# Patient Record
Sex: FEMALE | Race: WHITE | NOT HISPANIC OR LATINO | Employment: OTHER | URBAN - METROPOLITAN AREA
[De-identification: names, ages, dates, MRNs, and addresses within clinical notes are randomized per-mention and may not be internally consistent; named-entity substitution may affect disease eponyms.]

---

## 2023-05-09 LAB
ANION GAP IN SER/PLAS: 10 MMOL/L (ref 10–20)
APPEARANCE, URINE: CLEAR
BACTERIA, URINE: ABNORMAL /HPF
BILIRUBIN, URINE: NEGATIVE
BLOOD, URINE: ABNORMAL
CALCIUM (MG/DL) IN SER/PLAS: 9.5 MG/DL (ref 8.6–10.3)
CARBON DIOXIDE, TOTAL (MMOL/L) IN SER/PLAS: 30 MMOL/L (ref 21–32)
CHLORIDE (MMOL/L) IN SER/PLAS: 100 MMOL/L (ref 98–107)
COLOR, URINE: YELLOW
CREATININE (MG/DL) IN SER/PLAS: 0.87 MG/DL (ref 0.5–1.05)
GFR FEMALE: 69 ML/MIN/1.73M2
GLUCOSE (MG/DL) IN SER/PLAS: 99 MG/DL (ref 74–99)
GLUCOSE, URINE: NEGATIVE MG/DL
KETONES, URINE: NEGATIVE MG/DL
LEUKOCYTE ESTERASE, URINE: NEGATIVE
MUCUS, URINE: ABNORMAL /LPF
NITRITE, URINE: NEGATIVE
PH, URINE: 7 (ref 5–8)
POTASSIUM (MMOL/L) IN SER/PLAS: 4 MMOL/L (ref 3.5–5.3)
PROTEIN, URINE: NEGATIVE MG/DL
RBC, URINE: 6 /HPF (ref 0–5)
SODIUM (MMOL/L) IN SER/PLAS: 136 MMOL/L (ref 136–145)
SPECIFIC GRAVITY, URINE: 1.01 (ref 1–1.03)
SQUAMOUS EPITHELIAL CELLS, URINE: 1 /HPF
UREA NITROGEN (MG/DL) IN SER/PLAS: 14 MG/DL (ref 6–23)
UROBILINOGEN, URINE: <2 MG/DL (ref 0–1.9)
WBC, URINE: 1 /HPF (ref 0–5)

## 2023-06-08 LAB
ACTIVATED PARTIAL THROMBOPLASTIN TIME IN PPP BY COAGULATION ASSAY: 32 SEC (ref 26–39)
ANION GAP IN SER/PLAS: 10 MMOL/L (ref 10–20)
APPEARANCE, URINE: CLEAR
BACTERIA, URINE: ABNORMAL /HPF
BILIRUBIN, URINE: NEGATIVE
BLOOD, URINE: ABNORMAL
CALCIUM (MG/DL) IN SER/PLAS: 9.7 MG/DL (ref 8.6–10.3)
CARBON DIOXIDE, TOTAL (MMOL/L) IN SER/PLAS: 30 MMOL/L (ref 21–32)
CHLORIDE (MMOL/L) IN SER/PLAS: 100 MMOL/L (ref 98–107)
COLOR, URINE: YELLOW
CREATININE (MG/DL) IN SER/PLAS: 0.89 MG/DL (ref 0.5–1.05)
ERYTHROCYTE DISTRIBUTION WIDTH (RATIO) BY AUTOMATED COUNT: 13.2 % (ref 11.5–14.5)
ERYTHROCYTE MEAN CORPUSCULAR HEMOGLOBIN CONCENTRATION (G/DL) BY AUTOMATED: 32.9 G/DL (ref 32–36)
ERYTHROCYTE MEAN CORPUSCULAR VOLUME (FL) BY AUTOMATED COUNT: 101 FL (ref 80–100)
ERYTHROCYTES (10*6/UL) IN BLOOD BY AUTOMATED COUNT: 4.92 X10E12/L (ref 4–5.2)
GFR FEMALE: 68 ML/MIN/1.73M2
GLUCOSE (MG/DL) IN SER/PLAS: 134 MG/DL (ref 74–99)
GLUCOSE, URINE: NEGATIVE MG/DL
HEMATOCRIT (%) IN BLOOD BY AUTOMATED COUNT: 49.9 % (ref 36–46)
HEMOGLOBIN (G/DL) IN BLOOD: 16.4 G/DL (ref 12–16)
HYALINE CASTS, URINE: ABNORMAL /LPF
INR IN PPP BY COAGULATION ASSAY: 1 (ref 0.9–1.1)
KETONES, URINE: NEGATIVE MG/DL
LEUKOCYTE ESTERASE, URINE: NEGATIVE
LEUKOCYTES (10*3/UL) IN BLOOD BY AUTOMATED COUNT: 7.4 X10E9/L (ref 4.4–11.3)
NITRITE, URINE: NEGATIVE
NRBC (PER 100 WBCS) BY AUTOMATED COUNT: 0 /100 WBC (ref 0–0)
PH, URINE: 5 (ref 5–8)
PLATELETS (10*3/UL) IN BLOOD AUTOMATED COUNT: 198 X10E9/L (ref 150–450)
POTASSIUM (MMOL/L) IN SER/PLAS: 3.4 MMOL/L (ref 3.5–5.3)
PROTEIN, URINE: NEGATIVE MG/DL
PROTHROMBIN TIME (PT) IN PPP BY COAGULATION ASSAY: 11.5 SEC (ref 9.8–13.4)
RBC, URINE: ABNORMAL /HPF (ref 0–5)
SODIUM (MMOL/L) IN SER/PLAS: 137 MMOL/L (ref 136–145)
SPECIFIC GRAVITY, URINE: 1.01 (ref 1–1.03)
SQUAMOUS EPITHELIAL CELLS, URINE: <1 /HPF
UREA NITROGEN (MG/DL) IN SER/PLAS: 17 MG/DL (ref 6–23)
UROBILINOGEN, URINE: <2 MG/DL (ref 0–1.9)
WBC, URINE: ABNORMAL /HPF (ref 0–5)

## 2023-06-09 LAB — URINE CULTURE: NORMAL

## 2023-06-21 ENCOUNTER — HOSPITAL ENCOUNTER (OUTPATIENT)
Dept: DATA CONVERSION | Facility: HOSPITAL | Age: 73
End: 2023-06-21
Attending: UROLOGY | Admitting: UROLOGY
Payer: MEDICARE

## 2023-06-21 DIAGNOSIS — F17.200 NICOTINE DEPENDENCE, UNSPECIFIED, UNCOMPLICATED: ICD-10-CM

## 2023-06-21 DIAGNOSIS — N20.0 CALCULUS OF KIDNEY: ICD-10-CM

## 2023-06-21 DIAGNOSIS — M79.7 FIBROMYALGIA: ICD-10-CM

## 2023-06-21 DIAGNOSIS — F41.8 OTHER SPECIFIED ANXIETY DISORDERS: ICD-10-CM

## 2023-06-21 DIAGNOSIS — I10 ESSENTIAL (PRIMARY) HYPERTENSION: ICD-10-CM

## 2023-06-21 DIAGNOSIS — E03.9 HYPOTHYROIDISM, UNSPECIFIED: ICD-10-CM

## 2023-06-21 DIAGNOSIS — E78.5 HYPERLIPIDEMIA, UNSPECIFIED: ICD-10-CM

## 2023-08-16 LAB
AMMONIUM, 24 HR, U(MAYO): 36 MMOL/24 H (ref 15–56)
BRUSHITE CRYSTAL(MAYO): -0.62 DG
CALCIUM OXALATE CRYSTAL(MAYO): 1.47 DG
CALCIUM, 24 HR, U(MAYO): 105 MG/24 H
CHLORIDE, U(MAYO): 89 MMOL/24 H (ref 34–286)
CITRATE EXCRETION, U(MAYO): 444 MG/24 H
COLLECTION DURATION(MAYO): 24 H
CREATININE, U(MAYO): 1260 MG/24 H (ref 603–1783)
HEIGHT (CM)(MAYO): ABNORMAL CM
HYDROXYAPATITE CRYSTAL(MAYO): 2.47 DG
INTERPRETATION(MAYO): ABNORMAL
MAGNESIUM, 24 HR, U(MAYO): 63 MG/24 H (ref 51–269)
OSMOLALITY(MAYO): 624 MOSM/KG (ref 150–1150)
OXALATE, MG/24 H(MAYO): 17.6 MG/24 H (ref 9.7–40.5)
OXALATE, U (MMOL/24 H)(MAYO): 0.2 MMOL/24 H (ref 0.11–0.46)
PATIENT SURFACE AREA(MAYO): ABNORMAL 1.73M(2)
PH, U(MAYO): 5.7 (ref 4.5–8)
PHOSPHORUS, U(MAYO): 798 MG/24 H (ref 226–1797)
POTASSIUM, U(MAYO): 53 MMOL/24 H (ref 16–105)
PROTEIN CATABOLIC RATE, U(MAYO): 82 G/24 H (ref 56–125)
SODIUM, U(MAYO): 101 MMOL/24 H (ref 22–328)
SULFATE, 24 HR, U(MAYO): 17 MMOL/24 H (ref 7–47)
UREA NITROGEN, U(MAYO): 9.1 G/24 H (ref 7–42)
URIC ACID CRYSTAL(MAYO): 3.52 DG
URIC ACID, U(MAYO): 620 MG/24 H (ref 250–750)
VOLUME(MAYO): 1050 ML
WEIGHT (KG)(MAYO): 155 KG

## 2023-09-07 VITALS — HEIGHT: 63 IN | WEIGHT: 158.95 LBS | BODY MASS INDEX: 28.16 KG/M2

## 2023-09-30 NOTE — H&P
History & Physical Reviewed:   I have reviewed the History and Physical dated:  21-Jun-2023   History and Physical reviewed and relevant findings noted. Patient examined to review pertinent physical  findings.: No significant changes   Home Medications Reviewed: no changes noted   Allergies Reviewed: no changes noted       ERAS (Enhanced Recovery After Surgery):  ·  ERAS Patient: no     Consent:   COVID-19 Consent:  ·  COVID-19 Risk Consent Surgeon has reviewed key risks related to the risk of neela COVID-19 and if they contract COVID-19 what the risks are.       Electronic Signatures:  Terrance Nunez)  (Signed 21-Jun-2023 14:10)   Authored: History & Physical Reviewed, ERAS, Consent,  Note Completion      Last Updated: 21-Jun-2023 14:10 by Terrance Nunez)

## 2023-10-02 NOTE — OP NOTE
Post Operative Note:     PreOp Diagnosis: Right renal calculus   Post-Procedure Diagnosis: right renal calculus   Procedure: 1. right eswl  2.   3.   4.   5.   Surgeon: neelam   Resident/Fellow/Other Assistant: none   Anesthesia: general   Estimated Blood Loss (mL): none   Specimen: no   Complications: none   Findings: right mid pole renal calculus     Operative Report Dictated:  Dictation: yes   Date of Dictation: 21-Jun-2023     Attestation:   Note Completion:  Attending Attestation I performed the procedure without a resident         Electronic Signatures:  Terrance Nunez)  (Signed 21-Jun-2023 15:22)   Authored: Post Operative Note, Note Completion      Last Updated: 21-Jun-2023 15:22 by Terrance Nunez)

## 2024-05-06 NOTE — OP NOTE
SURGEON:  Terrance Nunez MD    PREOPERATIVE DIAGNOSIS:    Right renal calculus.    POSTOPERATIVE DIAGNOSIS:    Renal calculus.    PROCEDURE PERFORMED:    Right extracorporeal shock wave lithotripsy.    ANESTHESIA:    General.    CLINICAL INDICATIONS:    The patient has a history of stones, recently found to have a 5 mm  stone in the midpole of right kidney and after discussion of  treatment options, she has opted for lithotripsy.  Risks, benefits,  and alternative therapies have been reviewed with the patient.  Consent obtained.     DETAILS OF PROCEDURE:    The patient was brought to the operating room, put to sleep by  Anesthesia.  She was positioned supine on the lithotripsy table.  Stone was seen in midpole of the kidney, treated with 2500 shocks and  appeared to fragment nicely.  The patient tolerated the procedure  well, was awakened, and taken to recovery room in satisfactory  condition.  Discharged home with pain meds, antibiotics, and a  strainer to strain urine to catch stone fragments and she will follow  up in the office in 3 weeks.       Terrance Nunez MD    DD:  06/26/2023 11:31:00 EST  DT:  06/26/2023 11:41:05 EST  DICTATION NUMBER:  [id number]  INTERNAL JOB NUMBER:  [id number]      CC:ï¿½ VIVI PRATIBHA         Terrance Nunez MD       Fax: [phone]          Electronic Signatures:  Terrance Nnuez) (Signed on 28-Jun-2023 13:53)   Authored  Unsigned, Draft (SYS GENERATED) (Entered on 26-Jun-2023 11:41)   Entered    Last Updated: 28-Jun-2023 13:53 by Terrance Nunez)

## 2024-05-09 ENCOUNTER — HOSPITAL ENCOUNTER (OUTPATIENT)
Dept: RADIOLOGY | Facility: HOSPITAL | Age: 74
Discharge: HOME | End: 2024-05-09
Payer: MEDICARE

## 2024-05-09 DIAGNOSIS — N20.0 CALCULUS OF KIDNEY: ICD-10-CM

## 2024-05-09 PROCEDURE — 74018 RADEX ABDOMEN 1 VIEW: CPT

## 2024-05-09 PROCEDURE — 74018 RADEX ABDOMEN 1 VIEW: CPT | Performed by: STUDENT IN AN ORGANIZED HEALTH CARE EDUCATION/TRAINING PROGRAM

## 2025-06-27 ENCOUNTER — HOSPITAL ENCOUNTER (OUTPATIENT)
Dept: RADIOLOGY | Facility: HOSPITAL | Age: 75
Discharge: HOME | End: 2025-06-27
Payer: MEDICARE

## 2025-06-27 DIAGNOSIS — Z87.442 PERSONAL HISTORY OF URINARY CALCULI: ICD-10-CM

## 2025-06-27 PROCEDURE — 74018 RADEX ABDOMEN 1 VIEW: CPT
